# Patient Record
Sex: MALE | Race: WHITE | URBAN - METROPOLITAN AREA
[De-identification: names, ages, dates, MRNs, and addresses within clinical notes are randomized per-mention and may not be internally consistent; named-entity substitution may affect disease eponyms.]

---

## 2018-04-13 ENCOUNTER — OFFICE VISIT (OUTPATIENT)
Dept: INTERNAL MEDICINE | Facility: CLINIC | Age: 68
End: 2018-04-13
Payer: MEDICARE

## 2018-04-13 VITALS
BODY MASS INDEX: 24.55 KG/M2 | TEMPERATURE: 97.6 F | DIASTOLIC BLOOD PRESSURE: 57 MMHG | OXYGEN SATURATION: 98 % | RESPIRATION RATE: 16 BRPM | SYSTOLIC BLOOD PRESSURE: 88 MMHG | WEIGHT: 181 LBS | HEART RATE: 97 BPM

## 2018-04-13 DIAGNOSIS — S31.109D OPEN WOUND OF ABDOMINAL WALL, SUBSEQUENT ENCOUNTER: Primary | ICD-10-CM

## 2018-04-13 DIAGNOSIS — K94.19 ALTERED BOWEL ELIMINATION DUE TO INTESTINAL OSTOMY (H): ICD-10-CM

## 2018-04-13 LAB
BASOPHILS # BLD AUTO: 0 10E9/L (ref 0–0.2)
BASOPHILS NFR BLD AUTO: 0.2 %
DIFFERENTIAL METHOD BLD: ABNORMAL
EOSINOPHIL # BLD AUTO: 0.2 10E9/L (ref 0–0.7)
EOSINOPHIL NFR BLD AUTO: 2.6 %
ERYTHROCYTE [DISTWIDTH] IN BLOOD BY AUTOMATED COUNT: 14.9 % (ref 10–15)
GRAM STN SPEC: NORMAL
HCT VFR BLD AUTO: 37.5 % (ref 40–53)
HGB BLD-MCNC: 11.6 G/DL (ref 13.3–17.7)
LYMPHOCYTES # BLD AUTO: 4.1 10E9/L (ref 0.8–5.3)
LYMPHOCYTES NFR BLD AUTO: 46.3 %
MCH RBC QN AUTO: 28.4 PG (ref 26.5–33)
MCHC RBC AUTO-ENTMCNC: 30.9 G/DL (ref 31.5–36.5)
MCV RBC AUTO: 92 FL (ref 78–100)
MONOCYTES # BLD AUTO: 0.6 10E9/L (ref 0–1.3)
MONOCYTES NFR BLD AUTO: 7.3 %
NEUTROPHILS # BLD AUTO: 3.8 10E9/L (ref 1.6–8.3)
NEUTROPHILS NFR BLD AUTO: 43.6 %
PLATELET # BLD AUTO: 342 10E9/L (ref 150–450)
PROCALCITONIN SERPL-MCNC: <0.05 NG/ML
RBC # BLD AUTO: 4.08 10E12/L (ref 4.4–5.9)
SPECIMEN SOURCE: NORMAL
WBC # BLD AUTO: 8.8 10E9/L (ref 4–11)

## 2018-04-13 PROCEDURE — 87186 SC STD MICRODIL/AGAR DIL: CPT | Performed by: INTERNAL MEDICINE

## 2018-04-13 PROCEDURE — 87070 CULTURE OTHR SPECIMN AEROBIC: CPT | Performed by: INTERNAL MEDICINE

## 2018-04-13 PROCEDURE — 85025 COMPLETE CBC W/AUTO DIFF WBC: CPT | Performed by: INTERNAL MEDICINE

## 2018-04-13 PROCEDURE — 84145 PROCALCITONIN (PCT): CPT | Performed by: INTERNAL MEDICINE

## 2018-04-13 PROCEDURE — 87205 SMEAR GRAM STAIN: CPT | Performed by: INTERNAL MEDICINE

## 2018-04-13 PROCEDURE — 99204 OFFICE O/P NEW MOD 45 MIN: CPT | Performed by: INTERNAL MEDICINE

## 2018-04-13 PROCEDURE — 36415 COLL VENOUS BLD VENIPUNCTURE: CPT | Performed by: INTERNAL MEDICINE

## 2018-04-13 PROCEDURE — 87040 BLOOD CULTURE FOR BACTERIA: CPT | Performed by: INTERNAL MEDICINE

## 2018-04-13 PROCEDURE — 87077 CULTURE AEROBIC IDENTIFY: CPT | Performed by: INTERNAL MEDICINE

## 2018-04-13 NOTE — PATIENT INSTRUCTIONS
Continue wound care as you have been     Don't take antibiotic (Augmentin) if you're feeling well and wound no longer produced significant pus tonight or overnight     If any new abdominal pain, fever, chills, night sweats, or recurrence of pus from wound, OK to start antibiotic right away     Call sooner if questions or concerns

## 2018-04-13 NOTE — PROGRESS NOTES
SUBJECTIVE:   Jhonatan Araiza is a 68 year old male who presents to clinic today for the following health issues:      Possible infection      Duration: yesterday    Description (location/character/radiation): Home care nurse noticed green drainage on ABD pad yesterday and today full of pus.    Intensity:  moderate, severe    Accompanying signs and symptoms: slight discomfort    History (similar episodes/previous evaluation): surgery in December at [facility] for large intestine rupture    Precipitating or alleviating factors: cleaned area and new bandage    Therapies tried and outcome: wet/dry wound care     Last saw Dr. Wynne (ID) for abdominal abscess 3/19/2018 (see CareEverywhere).  Off antibiotic for at least 10 weeks now doing well.  Has home care wound nurse changing bandages MWF -- has significant purulence yesterday and today.  Otherwise feels well.  No changes in ostomy output, abdominal pain, fever, chills, or night sweats.    1. Wound infection    2. 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    PMH: Updated and/or reviewed in chart.    PSH: Updated and/or reviewed in chart.    Family History: Updated and/or reviewed in chart.     ROS:  Comprehensive ROS otherwise negative.    OBJECTIVE:                                                    BP (!) 88/57  Pulse 97  Temp 97.6  F (36.4  C) (Oral)  Resp 16  Wt 181 lb (82.1 kg)  SpO2 98%  BMI 24.55 kg/m2    GEN: No acute distress  EYES: No conjunctival injection or icterus, EOMI grossly intact  RESP: Unlabored, regular, clear to auscultation bilaterally  CV: regular pulse, no lower extremity edema  ABDO: abdomen soft, non-tender, non-distended, LLQ ostomy with small stool  SKIN: superficial open midline abdominal wound approx 7cm wide by 11cm with intact borders mildly erythematous, with excellent granulation tissue without purulence or drainage except moderate amount on non-foul smelling  serosanguinous drainage on gauze; culture from upper  obtained and sent  NEURO: Normal gait, MAEx4, light touch sensation grossly intact  PSYCH: Normal mood and affect         ASSESSMENT/PLAN:                                                    1. Wound infection  2. 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  However, his history of ifeoma pus without clear source contamination raises concern for local infection.  Further, his relative hypotension is concerning for developing SIRS.  We discussed obtaining laboratory evaluation including procalcitonin and blood and wound culture as below and waiting the night before starting empiric antibiotic while he is subjectively feeling OK.  If wound worsens or new systemic symptoms, OK to treat immediately.  Patient agreed with plan and demonstrated understanding to contact us for help if not improving or sooner if worsening or if other questions or concerns arise.  - Blood culture  - amoxicillin-clavulanate (AUGMENTIN) 875-125 MG per tablet; Take 1 tablet by mouth 2 times daily  Dispense: 28 tablet; Refill: 0  - Procalcitonin  - CBC with platelets differential  - Wound Culture Aerobic Bacterial  - Gram stain    ADDENDUM 4/14/2018 1:31 PM negative procalc, blood, and wound cultures thus far -- low risk and would avoid antibiotic         Patient Instructions      Continue wound care as you have been     Don't take antibiotic (Augmentin) if you're feeling well and wound no longer produced significant pus tonight or overnight     If any new abdominal pain, fever, chills, night sweats, or recurrence of pus from wound, OK to start antibiotic right away     Call sooner if questions or concerns     Orders Placed This Encounter     Procalcitonin     CBC with platelets differential     Ferrous Sulfate (IRON SUPPLEMENT PO)     amoxicillin-clavulanate (AUGMENTIN) 875-125 MG per tablet               Jose M Gonzalez MD

## 2018-04-13 NOTE — LETTER
April 17, 2018      Jhonatan Araiza  [address]  [facility] [address]        Dear ,    We are writing to inform you of your test results.    Jake,     It was nice to meet you in clinic last week.  The cultures are not final,but so far it looks like the wound and blood cultures are sterile (i.e., only inflammatory immune cells, no bugs). Your procalcitonin level was also normal, so it's unlikely you have an infection (or have one anymore).  I'd avoid the Augmentin for now unless your symptoms have changed.  Call sooner if questions or concerns.     Resulted Orders   Blood culture   Result Value Ref Range    Specimen Description Blood Right Arm     Special Requests Aerobic and anaerobic bottles received     Culture Micro No growth after 4 days    Procalcitonin   Result Value Ref Range    Procalcitonin <0.05 ng/ml      Comment:      <0.05 ng/ml  Normal  Recommendation: Very low risk of bacterial infection.   Discourage antibiotics unless strong clinical suspicion for serious infection.     CBC with platelets differential   Result Value Ref Range    WBC 8.8 4.0 - 11.0 10e9/L    RBC Count 4.08 (L) 4.4 - 5.9 10e12/L    Hemoglobin 11.6 (L) 13.3 - 17.7 g/dL    Hematocrit 37.5 (L) 40.0 - 53.0 %    MCV 92 78 - 100 fl    MCH 28.4 26.5 - 33.0 pg    MCHC 30.9 (L) 31.5 - 36.5 g/dL    RDW 14.9 10.0 - 15.0 %    Platelet Count 342 150 - 450 10e9/L    Diff Method Automated Method     % Neutrophils 43.6 %    % Lymphocytes 46.3 %    % Monocytes 7.3 %    % Eosinophils 2.6 %    % Basophils 0.2 %    Absolute Neutrophil 3.8 1.6 - 8.3 10e9/L    Absolute Lymphocytes 4.1 0.8 - 5.3 10e9/L    Absolute Monocytes 0.6 0.0 - 1.3 10e9/L    Absolute Eosinophils 0.2 0.0 - 0.7 10e9/L    Absolute Basophils 0.0 0.0 - 0.2 10e9/L   Wound Culture Aerobic Bacterial   Result Value Ref Range    Specimen Description Abdominal Wound     Culture Micro (A)      Moderate growth  Staphylococcus aureus  This isolate is presumed to be clindamycin  resistant based on detection of inducible   clindamycin resistance. Erythromycin and clindamycin are resistant, therefore, they are   not recommended for use.      Culture Micro (A)      Light growth  Coagulase negative Staphylococcus  Susceptibility testing not routinely done     Gram stain   Result Value Ref Range    Specimen Description Abdominal Wound     Gram Stain No organisms seen     Gram Stain Few  WBC'S seen  predominantly PMN's       Gram Stain Many RBCs seen        If you have any questions or concerns, please call the clinic at the number listed above.       Sincerely,    Jose M Gonzalez MD/[place]or

## 2018-04-13 NOTE — MR AVS SNAPSHOT
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 If you do not hear from us within 7 days, please contact the clinic through DNA Directhart or phone. If you have a critical or abnormal lab result, we will notify you by phone as soon as possible.  Submit refill requests through [employer] or call your pharmacy and they will forward the refill request to us. Please allow 3 business days for your refill to be completed.          If you need to speak with a  for additional information , please call: [phone]             Additional Information About Your Visit        MyCharScoreBig Information     [employer] lets you send messages to your doctor, view your test results, renew your prescriptions, schedule appointments and more. To sign up, go to [email or web address] . Click on \"Log in\" on the left side of the screen, which will take you to the Welcome page. Then click on \"Sign up Now\" on the right side of the page.     You will be asked to enter the access code listed below, as well as some personal information. Please follow the directions to create your username and password.     Your access code is: [email or web address]  Expires: 2018  3:06 PM     Your access code will  in 90 days. If you need help or a new code, please call your [facility] or [phone].        Care EveryWhere ID     This is your Care EveryWhere ID. This could be used by other " organizations to access your [place] medical records  [id number]        Your Vitals Were     Pulse Temperature Respirations Pulse Oximetry BMI (Body Mass Index)       97 97.6  F (36.4  C) (Oral) 16 98% 24.55 kg/m2        Blood Pressure from Last 3 Encounters:   04/13/18 (!) 88/57   04/04/11 142/87   03/24/11 128/71    Weight from Last 3 Encounters:   04/13/18 181 lb (82.1 kg)   04/04/11 231 lb (104.8 kg)   03/24/11 228 lb (103.4 kg)              Today, you had the following     No orders found for display         Today's Medication Changes          These changes are accurate as of 4/13/18  3:12 PM.  If you have any questions, ask your nurse or doctor.               Stop taking these medicines if you haven't already. Please contact your care team if you have questions.     cyclobenzaprine 5 MG tablet   Commonly known as:  FLEXERIL   Stopped by:  Jose M Gonzalez MD           FISH OIL PO   Stopped by:  Jose M Gonzalez MD           fluocinonide 0.05 % ointment   Commonly known as:  LIDEX   Stopped by:  Jose M Gonzalez MD           VIAGRA 50 MG tablet   Generic drug:  sildenafil   Stopped by:  Jose M Gonzalez MD           VITAMIN E   Stopped by:  Jose M Gonzalez MD                    Primary Care Provider Office Phone # Fax #    Ritesh Jn Vasquez [phone] [address] [facility] [place] [address]        Equal Access to Services     [facility]: Hadii adalgisa kingo Sodashawn, waaxda luqadaha, qaybta kaalmada adeegyada, wax chitra martell [facility] tan. So [facility] [phone].    ATENCIÓN: Si habla español, tiene a burdick disposición servicios gratuitos de asistencia lingüística. Llame al [phone].    We comply with applicable federal civil rights laws and [place] laws. We do not discriminate on the basis of race, color, national origin, age, disability, sex, sexual orientation, or gender identity.            Thank you!     Thank you for choosing [facility]  for your care. Our goal  is always to provide you with excellent care. Hearing back from our patients is one way we can continue to improve our services. Please take a few minutes to complete the written survey that you may receive in the mail after your visit with us. Thank you!             Your Updated Medication List - Protect others around you: Learn how to safely use, store and throw away your medicines at www.disposemymeds.org.          This list is accurate as of 4/13/18  3:12 PM.  Always use your most recent med list.                   Brand Name Dispense Instructions for use Diagnosis    aspirin 81 MG tablet      Take 1 tablet by mouth daily.        CALCIUM + D PO      1 tab everyother day        IRON SUPPLEMENT PO      Take 325 mg by mouth daily        MULTI VITAMIN MENS PO      1 every other day

## 2018-04-13 NOTE — NURSING NOTE
Chief Complaint   Patient presents with     Infection     post op area        Initial BP (!) 88/57  Pulse 97  Temp 97.6  F (36.4  C) (Oral)  Resp 16  Wt 181 lb (82.1 kg)  SpO2 98%  BMI 24.55 kg/m2 Estimated body mass index is 24.55 kg/(m^2) as calculated from the following:    Height as of 4/4/11: 6' (1.829 m).    Weight as of this encounter: 181 lb (82.1 kg).  Medication Reconciliation: complete

## 2018-04-15 LAB
BACTERIA SPEC CULT: ABNORMAL
BACTERIA SPEC CULT: ABNORMAL
SPECIMEN SOURCE: ABNORMAL

## 2018-04-19 ENCOUNTER — TELEPHONE (OUTPATIENT)
Dept: INTERNAL MEDICINE | Facility: CLINIC | Age: 68
End: 2018-04-19

## 2018-04-19 LAB
BACTERIA SPEC CULT: NO GROWTH
Lab: NORMAL
SPECIMEN SOURCE: NORMAL

## 2018-04-19 NOTE — TELEPHONE ENCOUNTER
Spoke with pt and he said he picked up the prescription and has been taking the antibiotic and will finish 14 day course. He said it is healing and the rash is gone, overall feels good. Instructed pt to call back if wound gets worse or produces puss/discharge. He verbalized understanding and appreciation for the call.

## 2018-04-19 NOTE — TELEPHONE ENCOUNTER
There was a small-moderate growth of Staph from the wound culture -- please check in with patient on wound healing / pus production / overall health.  If feeling well and no significant return of discharge, OK to continue to avoid antibiotic.  OK to follow-up in clinic if clinical uncertainty.

## 2018-05-08 ENCOUNTER — OFFICE VISIT (OUTPATIENT)
Dept: INTERNAL MEDICINE | Facility: CLINIC | Age: 68
End: 2018-05-08
Payer: MEDICARE

## 2018-05-08 VITALS
TEMPERATURE: 98.1 F | DIASTOLIC BLOOD PRESSURE: 62 MMHG | BODY MASS INDEX: 26.04 KG/M2 | WEIGHT: 186 LBS | RESPIRATION RATE: 16 BRPM | HEIGHT: 71 IN | SYSTOLIC BLOOD PRESSURE: 94 MMHG | HEART RATE: 96 BPM

## 2018-05-08 DIAGNOSIS — L08.9 CHRONIC WOUND INFECTION OF ABDOMEN, SUBSEQUENT ENCOUNTER: Primary | ICD-10-CM

## 2018-05-08 DIAGNOSIS — S31.109D CHRONIC WOUND INFECTION OF ABDOMEN, SUBSEQUENT ENCOUNTER: Primary | ICD-10-CM

## 2018-05-08 PROCEDURE — 99214 OFFICE O/P EST MOD 30 MIN: CPT | Performed by: INTERNAL MEDICINE

## 2018-05-08 RX ORDER — SULFAMETHOXAZOLE/TRIMETHOPRIM 800-160 MG
1 TABLET ORAL 2 TIMES DAILY
Qty: 60 TABLET | Refills: 2 | Status: SHIPPED | OUTPATIENT
Start: 2018-05-08

## 2018-05-08 ASSESSMENT — PATIENT HEALTH QUESTIONNAIRE - PHQ9: 5. POOR APPETITE OR OVEREATING: NOT AT ALL

## 2018-05-08 ASSESSMENT — ANXIETY QUESTIONNAIRES
5. BEING SO RESTLESS THAT IT IS HARD TO SIT STILL: NOT AT ALL
GAD7 TOTAL SCORE: 1
7. FEELING AFRAID AS IF SOMETHING AWFUL MIGHT HAPPEN: NOT AT ALL
IF YOU CHECKED OFF ANY PROBLEMS ON THIS QUESTIONNAIRE, HOW DIFFICULT HAVE THESE PROBLEMS MADE IT FOR YOU TO DO YOUR WORK, TAKE CARE OF THINGS AT HOME, OR GET ALONG WITH OTHER PEOPLE: NOT DIFFICULT AT ALL
3. WORRYING TOO MUCH ABOUT DIFFERENT THINGS: NOT AT ALL
6. BECOMING EASILY ANNOYED OR IRRITABLE: SEVERAL DAYS
2. NOT BEING ABLE TO STOP OR CONTROL WORRYING: NOT AT ALL
1. FEELING NERVOUS, ANXIOUS, OR ON EDGE: NOT AT ALL

## 2018-05-08 NOTE — MR AVS SNAPSHOT
After Visit Summary   5/8/2018    Jhonatan Araiza    MRN: [id number]           Patient Information     Date Of Birth          1950        Visit Information        Provider Department      5/8/2018 2:30 PM Jose M Gonzalez MD [facility]        Today's Diagnoses     Chronic wound infection of abdomen, subsequent encounter    -  1       Follow-ups after your visit        Additional Services     WOUND CARE REFERRAL       Your provider has referred you to: [facility]: Wound Ostomy - [place] [phone]   [email or web address]    Reason for referral: Wound care      1. [facility] Wound Consult appointment is related to what kind of wound: Open surgical wound    2. Location of wound: Abdomen    3. Reason for referral: Assess and treat as indicated    4. Desired treatment if any:Per [facility] nurse     Please be aware that coverage of these services is subject to the terms and limitations of your health insurance plan.  Call member services at your health plan with any benefit or coverage questions.      Please bring the following with you to your appointment:    (1) Any X-Rays, CTs or MRIs which have been performed.  Contact the facility where they were done to arrange for  prior to your scheduled appointment.    (2) List of current medications   (3) This referral request   (4) Any documents/labs given to you for this referral                  Who to contact     Normal or non-critical lab and imaging results will be communicated to you by MyChart, letter or phone within 4 business days after the clinic has received the results. If you do not hear from us within 7 days, please contact the clinic through MyChart or phone. If you have a critical or abnormal lab result, we will notify you by phone as soon as possible.  Submit refill requests through [employer] or call your pharmacy and they will forward the refill request to us. Please allow 3 business  "days for your refill to be completed.          If you need to speak with a  for additional information , please call: [phone]             Additional Information About Your Visit        Mr BananaharSolairedirect Information     Mr Banan[employer]art lets you send messages to your doctor, view your test results, renew your prescriptions, schedule appointments and more. To sign up, go to [email or web address] . Click on \"Log in\" on the left side of the screen, which will take you to the Welcome page. Then click on \"Sign up Now\" on the right side of the page.     You will be asked to enter the access code listed below, as well as some personal information. Please follow the directions to create your username and password.     Your access code is: [email or web address]  Expires: 2018  3:06 PM     Your access code will  in 90 days. If you need help or a new code, please call your [place] clinic or [phone].        Care EveryWhere ID     This is your Care EveryWhere ID. This could be used by other organizations to access your [place] medical records  [id number]        Your Vitals Were     Pulse Temperature Respirations Height BMI (Body Mass Index)       96 98.1  F (36.7  C) (Oral) 16 5' 11\" (1.803 m) 25.94 kg/m2        Blood Pressure from Last 3 Encounters:   18 94/62   18 (!) 88/57   11 142/87    Weight from Last 3 Encounters:   18 186 lb (84.4 kg)   18 181 lb (82.1 kg)   11 231 lb (104.8 kg)              We Performed the Following     WOUND CARE REFERRAL     Wound care          Today's Medication Changes          These changes are accurate as of 18  3:26 PM.  If you have any questions, ask your nurse or doctor.               Start taking these medicines.        Dose/Directions    sulfamethoxazole-trimethoprim 800-160 MG per tablet   Commonly known as:  BACTRIM DS/SEPTRA DS   Used for:  Chronic wound infection of abdomen, subsequent encounter   Started by:  Jose M Gonzalez MD        " Dose:  1 tablet   Take 1 tablet by mouth 2 times daily   Quantity:  60 tablet   Refills:  2            Where to get your medicines      These medications were sent to [facility] [address] [facility] [address] [address] [facility] [address]  [address] [facility] [address]     Phone:  [phone]     sulfamethoxazole-trimethoprim 800-160 MG per tablet                Primary Care Provider Office Phone # Fax #    Ritesh Jn Vasquez [phone] [address] [facility] [address]        Equal Access to Services     [facility]: Hadii aad ku hadasho Soomaali, waaxda luqadaha, qaybta kaalmada adeegyada, tiffany hernández . So [facility] [phone].    ATENCIÓN: Si habla español, tiene a burdick disposición servicios gratuitos de asistencia lingüística. [facility] [phone].    We comply with applicable federal civil rights laws and [place] laws. We do not discriminate on the basis of race, color, national origin, age, disability, sex, sexual orientation, or gender identity.            Thank you!     Thank you for choosing [facility]  for your care. Our goal is always to provide you with excellent care. Hearing back from our patients is one way we can continue to improve our services. Please take a few minutes to complete the written survey that you may receive in the mail after your visit with us. Thank you!             Your Updated Medication List - Protect others around you: Learn how to safely use, store and throw away your medicines at www.disposemymeds.org.          This list is accurate as of 5/8/18  3:26 PM.  Always use your most recent med list.                   Brand Name Dispense Instructions for use Diagnosis    aspirin 81 MG tablet      Take 1 tablet by mouth daily.        CALCIUM + D PO      1 tab everyother day        IRON SUPPLEMENT PO      Take 325 mg by mouth daily        MULTI VITAMIN MENS PO      1 every  other day        sulfamethoxazole-trimethoprim 800-160 MG per tablet    BACTRIM DS/SEPTRA DS    60 tablet    Take 1 tablet by mouth 2 times daily    Chronic wound infection of abdomen, subsequent encounter

## 2018-05-08 NOTE — PROGRESS NOTES
"  SUBJECTIVE:   Jhonatan Araiza is a 68 year old male who presents to clinic today for the following health issues:      Recheck on abdominal wound      Duration: 4/13    Description (location/character/radiation): finished antibiotic within 2 days infection was back     History (similar episodes/previous evaluation): see chart    Precipitating or alleviating factors: None    Therapies tried and outcome: None     Jhonatan had cessation in purulence from anterior abdominal wound while on Augmentin but otherwise noticed no changes and was feeling well.  After the course finished, within 48 hours he had recurrence of purulence.  He continues daily wound changes and had thrice weekly RN dressing changes.      The previous culture had two strains of Staph aureus, one likely a normal skin \"contaminant\" and the other more likely the pathogen which was clindamycin resistant, penicillin resistent, TMP-SMX sensitive.    He denies complaints otherwise.    1. Chronic wound infection of abdomen, subsequent encounter        PMH: Updated and/or reviewed in chart.    ROS:  Constitutional, skin systems are otherwise negative.    OBJECTIVE:                                                    BP 94/62  Pulse 96  Temp 98.1  F (36.7  C) (Oral)  Resp 16  Ht 5' 11\" (1.803 m)  Wt 186 lb (84.4 kg)  BMI 25.94 kg/m2    GEN: no acute distress   SKIN: gauze-packed anterior abdominal wound with healthy-appearing granulation tissue but also significant purulence without foul odor or excessive drainage.  Skin borders preserved.    Results for orders placed or performed in visit on 04/13/18   Procalcitonin   Result Value Ref Range    Procalcitonin <0.05 ng/ml   CBC with platelets differential   Result Value Ref Range    WBC 8.8 4.0 - 11.0 10e9/L    RBC Count 4.08 (L) 4.4 - 5.9 10e12/L    Hemoglobin 11.6 (L) 13.3 - 17.7 g/dL    Hematocrit 37.5 (L) 40.0 - 53.0 %    MCV 92 78 - 100 fl    MCH 28.4 26.5 - 33.0 pg    MCHC 30.9 (L) 31.5 - 36.5 g/dL    " RDW 14.9 10.0 - 15.0 %    Platelet Count 342 150 - 450 10e9/L    Diff Method Automated Method     % Neutrophils 43.6 %    % Lymphocytes 46.3 %    % Monocytes 7.3 %    % Eosinophils 2.6 %    % Basophils 0.2 %    Absolute Neutrophil 3.8 1.6 - 8.3 10e9/L    Absolute Lymphocytes 4.1 0.8 - 5.3 10e9/L    Absolute Monocytes 0.6 0.0 - 1.3 10e9/L    Absolute Eosinophils 0.2 0.0 - 0.7 10e9/L    Absolute Basophils 0.0 0.0 - 0.2 10e9/L   Blood culture   Result Value Ref Range    Specimen Description Blood Right Arm     Special Requests Aerobic and anaerobic bottles received     Culture Micro No growth    Wound Culture Aerobic Bacterial   Result Value Ref Range    Specimen Description Abdominal Wound     Culture Micro (A)      Moderate growth  Staphylococcus aureus  This isolate is presumed to be clindamycin resistant based on detection of inducible   clindamycin resistance. Erythromycin and clindamycin are resistant, therefore, they are   not recommended for use.      Culture Micro (A)      Light growth  Coagulase negative Staphylococcus  Susceptibility testing not routinely done         Susceptibility    Staphylococcus aureus - JOSE     CLINDAMYCIN  Resistant ug/mL     ERYTHROMYCIN >=8 Resistant ug/mL     GENTAMICIN <=0.5 Sensitive ug/mL     OXACILLIN <=0.25 Sensitive ug/mL     PENICILLIN  Resistant ug/mL     TETRACYCLINE <=1 Sensitive ug/mL     Trimethoprim/Sulfa <=0.5/9.5 Sensitive ug/mL     VANCOMYCIN <=0.5 Sensitive ug/mL   Gram stain   Result Value Ref Range    Specimen Description Abdominal Wound     Gram Stain No organisms seen     Gram Stain Few  WBC'S seen  predominantly PMN's       Gram Stain Many RBCs seen       ASSESSMENT/PLAN:                                                    1. Chronic wound infection of abdomen, subsequent encounter  Transition to Bactrim for at least 2 weeks and then pause to determine whether a more prolonged course will have benefit.  I would like him to establish with a wound clinic for more  specialized treatment, including increasing wound changes to twice daily.  Potentially continue suppressive TMP-SMX until wound is healed if necessary.  [facility] and may need to return if not continuing to heal as expected.  He is clinically and hemodynamically normal/stable despite soft BPs and borderline tachycardia.  - sulfamethoxazole-trimethoprim (BACTRIM DS/SEPTRA DS) 800-160 MG per tablet; Take 1 tablet by mouth 2 times daily  Dispense: 60 tablet; Refill: 2  - WOUND CARE REFERRAL       Orders Placed This Encounter     WOUND CARE REFERRAL     sulfamethoxazole-trimethoprim (BACTRIM DS/SEPTRA DS) 800-160 MG per tablet              Jose M Gonzalez MD

## 2018-05-09 ASSESSMENT — PATIENT HEALTH QUESTIONNAIRE - PHQ9: SUM OF ALL RESPONSES TO PHQ QUESTIONS 1-9: 1

## 2018-05-09 ASSESSMENT — ANXIETY QUESTIONNAIRES: GAD7 TOTAL SCORE: 1

## 2018-06-01 ENCOUNTER — OFFICE VISIT (OUTPATIENT)
Dept: WOUND CARE | Facility: CLINIC | Age: 68
End: 2018-06-01
Payer: MEDICARE

## 2018-06-01 DIAGNOSIS — S31.109A OPEN ABDOMINAL WALL WOUND, INITIAL ENCOUNTER: Primary | ICD-10-CM

## 2018-06-01 NOTE — MR AVS SNAPSHOT
After Visit Summary   2018    Jhonatan Araiza    MRN: [id number]           Patient Information     Date Of Birth          1950        Visit Information        Provider Department      2018 9:30 AM Maya Christensen RN [facility]        Today's Diagnoses     Open abdominal wall wound, initial encounter    -  1       Follow-ups after your visit        Your next 10 appointments already scheduled     Nicolás 15, 2018 12:30 PM CDT   RETURN WOUND NURSE VISIT with [facility] ([facility] [place])    [address] [place] [id number] [phone]              Who to contact     Please call your clinic at [phone] to:    Ask questions about your health    Make or cancel appointments    Discuss your medicines    Learn about your test results    Speak to your doctor            Additional Information About Your Visit        MyChart Information     [employer] is an electronic gateway that provides easy, online access to your medical records. With [employer], you can request a clinic appointment, read your test results, renew a prescription or communicate with your care team.     To sign up for [employer] visit the website at [email or web address]   You will be asked to enter the access code listed below, as well as some personal information. Please follow the directions to create your username and password.     Your access code is: [email or web address]  Expires: 2018  3:06 PM     Your access code will  in 90 days. If you need help or a new code, please contact your [facility] or call [phone] for assistance.        Care EveryWhere ID     This is your Care EveryWhere ID. This could be used by other organizations to access your [place] medical records  [id number]         Blood Pressure from Last 3 Encounters:   18 94/62   18 (!) 88/57   11 142/87    Weight from  Last 3 Encounters:   05/08/18 84.4 kg (186 lb)   04/13/18 82.1 kg (181 lb)   04/04/11 104.8 kg (231 lb)              Today, you had the following     No orders found for display       Primary Care Provider Office Phone # Fax #    Ritesh Vasquez [phone] [address] [facility] [address]        Equal Access to Services     BETH MENDEZ : Hadii aad ku hadasho Soomaali, waaxda luqadaha, qaybta kaalmada adeegyada, waxay idiin hayaan adeeg guillebabarmontana lakamran . So [facility] [phone].    ATENCIÓN: Si habla espnicole, tiene a burdick disposición servicios gratuitos de asistencia lingüística. Llame al [phone].    We comply with applicable federal civil rights laws and [place] laws. We do not discriminate on the basis of race, color, national origin, age, disability, sex, sexual orientation, or gender identity.            Thank you!     Thank you for choosing [facility]  for your care. Our goal is always to provide you with excellent care. Hearing back from our patients is one way we can continue to improve our services. Please take a few minutes to complete the written survey that you may receive in the mail after your visit with us. Thank you!             Your Updated Medication List - Protect others around you: Learn how to safely use, store and throw away your medicines at www.disposemymeds.org.          This list is accurate as of 6/1/18 10:37 AM.  Always use your most recent med list.                   Brand Name Dispense Instructions for use Diagnosis    aspirin 81 MG tablet      Take 1 tablet by mouth daily.        CALCIUM + D PO      1 tab everyother day        IRON SUPPLEMENT PO      Take 325 mg by mouth daily        MULTI VITAMIN MENS PO      1 every other day        sulfamethoxazole-trimethoprim 800-160 MG per tablet    BACTRIM DS/SEPTRA DS    60 tablet    Take 1 tablet by mouth 2 times daily    Chronic wound infection of abdomen, subsequent encounter

## 2018-06-01 NOTE — PROGRESS NOTES
Patient comes to wound clinic for wound assessment per request of Dr. Jose M Gonzalez. He has history of a Open surgical wound. Clean gloves are donned and current dressing removed. Previous treatment includes: covering with dry gauze  This is treatment week: 2 compared to last week              Objective findings:      Location: mid trunk     Wound measured.:  5 x 3 x 0.1  And  2 x 0.5 x0.1 and medially 1.5 cm x 2 cm x 0.1 cm  Full thickness.    Wound description: no sign of infection    Tenderness: no    Odor: none     Not inflamed.    Drainage is scant, sanguinous     Tunneling:  N/A      Undermining: N/A    Wound Base: moist, granulation and non-granulation     Slough appearance:  none      Eschar appearance:  none        Periwound Skin: pink and fragile      Subjective finding:     Patient is assessed for discomfort which is: minimal    Today's status of the wound: improving    Treatment: Cleansing with Microklenz spray applying xeroform gauze to wound bed, covering with gauze and securing with medipore tape to intact skin. Patient to be seen in wound clinic in 2 week. Patient stated that he is on his third week of antibiotic course.    Pt received the following instructions: Cleansing with surgical scrub, rinsing with NS, in the monring and Microklenz spray in the evening. Applying xeroform gauze to wound bed, covering with non adherent dressing then gauze dressing and securing with medipore tape to intact skin. Patient to be seen in wound clinic in 2 week.    Signs and symptoms of infection taught.      Melinda Ortega was available for supervision of care if needed or if questions should arise and regarding plan of care. Maya Christensen RN CWON

## 2018-07-20 ENCOUNTER — TELEPHONE (OUTPATIENT)
Dept: FAMILY MEDICINE | Facility: CLINIC | Age: 68
End: 2018-07-20

## 2018-07-20 NOTE — TELEPHONE ENCOUNTER
The Patient declined Preventive Health Screens for: colonoscopy screenig  Please review chart and follow-up with patient if needed.    Thank you      Outreach ,  Lucretia Martinez

## 2020-09-10 ENCOUNTER — TELEPHONE (OUTPATIENT)
Dept: FAMILY MEDICINE | Facility: CLINIC | Age: 70
End: 2020-09-10

## 2020-09-10 NOTE — TELEPHONE ENCOUNTER
Physician's Statement form received from [employer]. Placed in your basket to complete.Nohelia Rausch MA/TC

## 2020-11-03 NOTE — TELEPHONE ENCOUNTER
Form was sent to Medical Records, now in [place].  Patient was last seen in 2018 by , routing to  to see if he can complete this.